# Patient Record
Sex: FEMALE | Race: WHITE
[De-identification: names, ages, dates, MRNs, and addresses within clinical notes are randomized per-mention and may not be internally consistent; named-entity substitution may affect disease eponyms.]

---

## 2017-07-06 ENCOUNTER — HOSPITAL ENCOUNTER (EMERGENCY)
Dept: HOSPITAL 25 - UCCORT | Age: 67
Discharge: HOME | End: 2017-07-06
Payer: COMMERCIAL

## 2017-07-06 VITALS — DIASTOLIC BLOOD PRESSURE: 68 MMHG | SYSTOLIC BLOOD PRESSURE: 131 MMHG

## 2017-07-06 DIAGNOSIS — R11.0: ICD-10-CM

## 2017-07-06 DIAGNOSIS — R07.89: ICD-10-CM

## 2017-07-06 DIAGNOSIS — K04.7: Primary | ICD-10-CM

## 2017-07-06 DIAGNOSIS — F17.210: ICD-10-CM

## 2017-07-06 DIAGNOSIS — Z88.1: ICD-10-CM

## 2017-07-06 DIAGNOSIS — Z90.710: ICD-10-CM

## 2017-07-06 PROCEDURE — 99211 OFF/OP EST MAY X REQ PHY/QHP: CPT

## 2017-07-06 PROCEDURE — 93005 ELECTROCARDIOGRAM TRACING: CPT

## 2017-07-06 PROCEDURE — G0463 HOSPITAL OUTPT CLINIC VISIT: HCPCS

## 2017-07-06 NOTE — UC
UC General HPI





- HPI Summary


HPI Summary: 





Nausea and dizziness off on for 4 days; constipation. Three upper right teeth 

extraction 6/20/17. Pt finished script for Amoxicillin  BID 6/30/17. Headache, 

right upper facial pain reported. Also right sided ear pressure and recent 

allergy symptoms. Has had some upset stomach since taking the amoxicillin and 

some nausea but no vomiting or diarrhea. no MARCH or CP with exertion -- she has 

had stress and noticed chest pressure before bed the past 2-3 days but more on 

her mind and not with exertion 


[ End ]





- History of Current Complaint


Chief Complaint: UCDentalProblem


Stated Complaint: FEVER ORAL COMPLAINT NAUSEA


Time Seen by Provider: 07/06/17 12:59


Hx Obtained From: Patient


Onset/Duration: Gradual Onset


Timing: Constant


Onset Severity: Mild


Current Severity: Moderate





- Allergy/Home Medications


Allergies/Adverse Reactions: 


 Allergies











Allergy/AdvReac Type Severity Reaction Status Date / Time


 


Clavulanic Acid Allergy  GI Upset Verified 07/06/17 12:49





[From Augmentin]     


 


ENVIRONMENTAL Allergy  Congestion Uncoded 07/06/17 12:49


 


seasonal Allergy  Congestion Uncoded 07/06/17 12:49














PMH/Surg Hx/FS Hx/Imm Hx


Previously Healthy: Yes





- Surgical History


Surgical History: Yes


Surgery Procedure, Year, and Place: thyroid 1970s, sinus 1987, hysterectomy 

1995.  right carpal tunnel surgery 7/2016





- Family History


Known Family History: Positive: Cardiac Disease - Mom at 93, Diabetes, Other - 

no COPD or asthma Fhx





- Social History


Occupation: Employed Full-time


Lives: With Family


Alcohol Use: Occasionally


Substance Use Type: None


Smoking Status (MU): Heavy Every Day Tobacco Smoker


Type: Cigarettes


Amount Used/How Often: < 10 CIGARETTES PER DAY


Length of Time of Smoking/Using Tobacco: 30+ YEARS


Have You Smoked in the Last Year: Yes


Household Exposure Type: Cigarettes


Cessation Counseling: Patient Advised to Stop





- Immunization History


Most Recent Influenza Vaccination: not this season





Review of Systems


Constitutional: Fatigue


Skin: Negative


Eyes: Negative


ENT: Negative


Respiratory: Negative


Cardiovascular: Other - intermittent chest pressure just before bed past 2 days


Gastrointestinal: Abdominal Pain - mid epigastric intermittently and RLQ 

intermittently for 10 days, Nausea


Genitourinary: Negative


Motor: Negative


Neurovascular: Negative


Musculoskeletal: Negative


Neurological: Headache


Psychological: Negative


All Other Systems Reviewed And Are Negative: Yes





Physical Exam


Triage Information Reviewed: Yes


Appearance: Well-Appearing, No Pain Distress, Well-Nourished


Vital Signs: 


 Initial Vital Signs











Temp  98.3 F   07/06/17 12:35


 


Pulse  83   07/06/17 12:35


 


Resp  12   07/06/17 12:35


 


BP  131/68   07/06/17 12:35


 


Pulse Ox  100   07/06/17 12:35











Vital Signs Reviewed: Yes


Eye Exam: Normal


ENT Exam: Normal


ENT: Positive: Hearing grossly normal, Pharynx normal, TM dull - R/L.  Negative

: Tonsillar swelling, Tonsillar exudate, Trismus, Muffled/hoarse voice


Dental: Positive: Percussion Tenderness @ - Right upper 2 teeth aroud the other 

teeth that were removed recently, Gross Decay/Caries @.  Negative: Abscess @


Neck exam: Normal


Neck: Positive: 1


Respiratory Exam: Normal


Cardiovascular Exam: Normal


Abdominal Exam: Normal


Abdomen Description: Positive: No Organomegaly, Soft, Other: - very mild mid 

epigastric and RLQ pain to palpation.  Negative: CVA Tenderness (R), CVA 

Tenderness (L), Distended, Guarding


Musculoskeletal Exam: Normal


Neurological Exam: Normal


Psychological Exam: Normal


Skin Exam: Normal





Re-Evaluation





- Re-Evaluation


  ** First Eval


Change: Unchanged - EKG shows NSR with old possible MI -- no active angina / 

chest pressure with exertion -- very active at work and no pain / CP / Palp / 

MARCH -- I advised heavily to f/u with PCP to get stress testing performed in 

very near future and if this Chest pressure changes / worsens to go to ED 

immediately as she declined going to ED at this time and aware or risks.





Course/Dx





- Course


Course Of Treatment: Mid epigastric tenderness could be from recent Antibiotic 

use = advised to start probiotics -- since she finished the antibiotics her 

tooth pain has worsened and now with a headache on the right side as well. Its 

possible she is having more infection in the mouth especially since she smokes 

still so there is concern for dry socket as well. I advise to stop smoking. 

Resume antibiotics, have anti nausea med available and get stress test as a 

precaution with her PCP in the next week or so . She is agreeable to plan at 

this time. No ED needed today since patient feels asymptomatic at this time but 

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 

will go to PCP today for f/u and set up further testing and aware to go to ED 

if any change in symptoms.





- Differential Dx - Multi-Symptom


Provider Diagnoses: Dental abscess / nausea / atypical chest pressure





Discharge





- Discharge Plan


Condition: Good


Disposition: HOME


Prescriptions: 


Amoxicillin CAP* [Amoxicillin 500 MG CAP*] 500 mg PO TID #21 cap


Ondansetron ODT TAB* [Zofran 4 MG Odt TAB*] 4 mg PO Q8H PRN #20 tab.odt


 PRN Reason: Nausea


Patient Education Materials:  Dental Abscess (ED), Gastritis (ED)


Referrals: 


HERNANDEZ Cee PA [Primary Care Provider] - 1 Day


Additional Instructions: 


Please follow up with your PCP ASAP for further eval

## 2017-09-28 ENCOUNTER — HOSPITAL ENCOUNTER (EMERGENCY)
Dept: HOSPITAL 25 - UCCORT | Age: 67
Discharge: HOME | End: 2017-09-28
Payer: COMMERCIAL

## 2017-09-28 VITALS — SYSTOLIC BLOOD PRESSURE: 122 MMHG | DIASTOLIC BLOOD PRESSURE: 71 MMHG

## 2017-09-28 DIAGNOSIS — F17.210: ICD-10-CM

## 2017-09-28 DIAGNOSIS — J32.9: Primary | ICD-10-CM

## 2017-09-28 PROCEDURE — G0463 HOSPITAL OUTPT CLINIC VISIT: HCPCS

## 2017-09-28 PROCEDURE — 86803 HEPATITIS C AB TEST: CPT

## 2017-09-28 PROCEDURE — 99212 OFFICE O/P EST SF 10 MIN: CPT

## 2017-09-28 PROCEDURE — 36415 COLL VENOUS BLD VENIPUNCTURE: CPT

## 2017-09-28 NOTE — UC
Throat Pain/Nasal Roni HPI





- HPI Summary


HPI Summary: 





Pt presents with c/o nasal congestion, sinus pressure and pain X3 weeks.  Pt 

reports that she has a "fever at night".  Denies weight loss in the last 6 

months.  





- History of Current Complaint


Chief Complaint: UCGeneralIllness


Stated Complaint: SINUS,HEADACHE


Time Seen by Provider: 09/28/17 12:41


Hx Obtained From: Patient


Hx Last Menstrual Period: n/a


Pregnant?: No


Onset/Duration: Gradual Onset, Lasting Weeks - 3, Worse Since - onset


Severity: Moderate


Associated Signs & Symptoms: Positive: Sinus Discomfort


Related History: Seasonal Allergies





- Epiglottits Risk Factors


Epiglottis Risk Factors: Negative





- Allergies/Home Medications


Allergies/Adverse Reactions: 


 Allergies











Allergy/AdvReac Type Severity Reaction Status Date / Time


 


Clavulanic Acid Allergy  GI Upset Verified 09/28/17 12:40





[From Augmentin]     


 


ENVIRONMENTAL Allergy  Congestion Uncoded 09/28/17 12:40


 


seasonal Allergy  Congestion Uncoded 09/28/17 12:40











Home Medications: 


 Home Medications





Omeprazole CAP* [Prilosec CAP* 20 MG] 20 mg PO DAILY 09/28/17 [History 

Confirmed 09/28/17]











PMH/Surg Hx/FS Hx/Imm Hx


Previously Healthy: Yes





- Surgical History


Surgical History: Yes


Surgery Procedure, Year, and Place: thyroid 1970s, sinus 1987, hysterectomy 

1995.  right carpal tunnel surgery 7/2016





- Family History


Known Family History: Positive: Cardiac Disease - Mom at 93, Diabetes, Other - 

no COPD or asthma Fhx





- Social History


Occupation: Employed Full-time


Lives: Alone


Alcohol Use: Occasionally


Substance Use Type: None


Smoking Status (MU): Heavy Every Day Tobacco Smoker


Type: Cigarettes


Amount Used/How Often: 1/2 PPD


Length of Time of Smoking/Using Tobacco: 30+ YEARS


Have You Smoked in the Last Year: Yes


Household Exposure Type: Cigarettes





- Immunization History


Most Recent Influenza Vaccination: not this season





Review of Systems


Constitutional: Fever - at night


Skin: Negative


Eyes: Negative


ENT: Sinus Congestion, Sinus Pain/Tenderness, Other - nasal congestion


Respiratory: Negative


Cardiovascular: Negative


Gastrointestinal: Negative


Genitourinary: Negative


Motor: Negative


Neurovascular: Negative


Musculoskeletal: Negative


Neurological: Headache


Psychological: Negative


Is Patient Immunocompromised?: No


All Other Systems Reviewed And Are Negative: Yes





Physical Exam


Triage Information Reviewed: Yes


Appearance: Well-Appearing


Vital Signs: 


 Initial Vital Signs











Temp  98 F   09/28/17 12:42


 


Pulse  83   09/28/17 12:42


 


Resp  20   09/28/17 12:42


 


BP  122/71   09/28/17 12:42


 


Pulse Ox  100   09/28/17 12:42











Vital Signs Reviewed: Yes


Eye Exam: Normal


ENT Exam: Other


ENT: Positive: Nasal congestion, Other: - sinus tenderness, frontal and 

maxillary


Neck exam: Normal


Respiratory Exam: Normal


Cardiovascular Exam: Normal


Musculoskeletal Exam: Normal


Neurological Exam: Normal


Psychological Exam: Normal


Skin Exam: Normal





Throat Pain/Nasal Course/Dx





- Differential Dx/Diagnosis


Differential Diagnosis/HQI/PQRI: Sinusitis, URI


Provider Diagnoses: sinusitis





Discharge





- Discharge Plan


Condition: Stable


Disposition: HOME


Prescriptions: 


Amoxicillin PO (*) [Amoxicillin 875 MG (*)] 875 mg PO Q12H #20 tab


Patient Education Materials:  Sinusitis (ED)


Referrals: 


HERNANDEZ Cee PA [Primary Care Provider] - If Needed

## 2018-01-09 ENCOUNTER — HOSPITAL ENCOUNTER (EMERGENCY)
Dept: HOSPITAL 25 - UCCORT | Age: 68
Discharge: HOME | End: 2018-01-09
Payer: MEDICARE

## 2018-01-09 VITALS — DIASTOLIC BLOOD PRESSURE: 76 MMHG | SYSTOLIC BLOOD PRESSURE: 125 MMHG

## 2018-01-09 DIAGNOSIS — J32.9: Primary | ICD-10-CM

## 2018-01-09 DIAGNOSIS — Z72.0: ICD-10-CM

## 2018-01-09 DIAGNOSIS — Z72.89: ICD-10-CM

## 2018-01-09 PROCEDURE — 99212 OFFICE O/P EST SF 10 MIN: CPT

## 2018-01-09 PROCEDURE — G0463 HOSPITAL OUTPT CLINIC VISIT: HCPCS

## 2018-01-09 NOTE — UC
Throat Pain/Nasal Roni HPI





- HPI Summary


HPI Summary: 





sinus pain and pressure x 4 days


cold sx x 2 weeks


no fever, no chills, no cough 





- History of Current Complaint


Chief Complaint: UCGeneralIllness


Stated Complaint: SINUS COMPLAINT


Time Seen by Provider: 01/09/18 10:56


Hx Obtained From: Patient


Hx Last Menstrual Period: n/a


Onset/Duration: Gradual Onset, Lasting Days - 4, Still Present


Severity: Moderate


Cough: None


Associated Signs & Symptoms: Positive: Sinus Discomfort, Nasal Discharge.  

Negative: Wheezing, Hoarseness, Fever, Vomiting, Rash





- Allergies/Home Medications


Allergies/Adverse Reactions: 


 Allergies











Allergy/AdvReac Type Severity Reaction Status Date / Time


 


Clavulanic Acid Allergy  GI Upset Verified 01/09/18 10:55





[From Augmentin]     


 


ENVIRONMENTAL Allergy  Congestion Uncoded 01/09/18 10:55


 


seasonal Allergy  Congestion Uncoded 01/09/18 10:55














PMH/Surg Hx/FS Hx/Imm Hx


Endocrine History: Thyroid Disease





- Surgical History


Surgical History: Yes


Surgery Procedure, Year, and Place: thyroid 1970s, sinus 1987, hysterectomy 

1995.  right carpal tunnel surgery 7/2016





- Family History


Known Family History: Positive: Cardiac Disease - Mom at 93, Diabetes, Other - 

no COPD or asthma Fhx





- Social History


Alcohol Use: Occasionally


Substance Use Type: None


Smoking Status (MU): Heavy Every Day Tobacco Smoker


Type: Cigarettes


Amount Used/How Often: 1/2 PPD


Length of Time of Smoking/Using Tobacco: 30+ YEARS


Have You Smoked in the Last Year: Yes


Household Exposure Type: Cigarettes





- Immunization History


Most Recent Influenza Vaccination: not this season





Review of Systems


Constitutional: Negative


Skin: Negative


Eyes: Negative


ENT: Nasal Discharge, Sinus Congestion, Sinus Pain/Tenderness


Respiratory: Negative


Cardiovascular: Negative


Is Patient Immunocompromised?: No


All Other Systems Reviewed And Are Negative: Yes





Physical Exam


Triage Information Reviewed: Yes


Appearance: Well-Appearing, No Pain Distress, Well-Nourished


Vital Signs: 


 Initial Vital Signs











Temp  98 F   01/09/18 10:51


 


Pulse  80   01/09/18 10:51


 


Resp  16   01/09/18 10:51


 


BP  125/76   01/09/18 10:51


 


Pulse Ox  100   01/09/18 10:51











Vital Signs Reviewed: Yes


Eyes: Positive: Conjunctiva Clear


ENT Exam: Normal


ENT: Positive: Normal ENT inspection, Hearing grossly normal, Pharyngeal 

erythema, Nasal congestion, Sinus tenderness


Neck: Positive: Supple, Nontender, No Lymphadenopathy


Respiratory: Positive: Chest non-tender, Lungs clear, Normal breath sounds


Cardiovascular: Positive: RRR, No Murmur, Pulses Normal


Skin Exam: Normal





Throat Pain/Nasal Course/Dx





- Differential Dx/Diagnosis


Provider Diagnoses: sinusitis





Discharge





- Discharge Plan


Condition: Critical


Disposition: HOME


Prescriptions: 


Azithromycin TAB* [Zithromax TAB (Z-KAVON) 250 mg #6 tabs] 2 tab PO .TODAY, THEN 

1 DAILY #1 kavon


Naproxen [Naprosyn 500 mg] 500 mg PO BID #20 tab


Patient Education Materials:  Sinusitis (ED)


Referrals: 


Allyson Cornejo MD [Primary Care Provider] - If Needed

## 2018-05-17 ENCOUNTER — HOSPITAL ENCOUNTER (EMERGENCY)
Dept: HOSPITAL 25 - UCCORT | Age: 68
Discharge: HOME | End: 2018-05-17
Payer: COMMERCIAL

## 2018-05-17 VITALS — DIASTOLIC BLOOD PRESSURE: 66 MMHG | SYSTOLIC BLOOD PRESSURE: 123 MMHG

## 2018-05-17 DIAGNOSIS — Z88.8: ICD-10-CM

## 2018-05-17 DIAGNOSIS — F17.210: ICD-10-CM

## 2018-05-17 DIAGNOSIS — Z88.0: ICD-10-CM

## 2018-05-17 DIAGNOSIS — J20.9: Primary | ICD-10-CM

## 2018-05-17 PROCEDURE — 99213 OFFICE O/P EST LOW 20 MIN: CPT

## 2018-05-17 PROCEDURE — 71046 X-RAY EXAM CHEST 2 VIEWS: CPT

## 2018-05-17 PROCEDURE — G0463 HOSPITAL OUTPT CLINIC VISIT: HCPCS

## 2018-05-17 RX ADMIN — ALBUTEROL SULFATE ONE PUFF: 90 AEROSOL, METERED RESPIRATORY (INHALATION) at 16:12

## 2018-05-17 RX ADMIN — PREDNISONE ONE MG: 20 TABLET ORAL at 16:11

## 2018-05-17 NOTE — RAD
INDICATION:  Cough for 3 weeks.



COMPARISON:  Comparison is made with a prior study from August 04, 2016.



TECHNIQUE: Dual-energy PA  and lateral views of the chest were obtained.



FINDINGS:   The heart is within normal limits in size. Mediastinal and hilar contours

appear within normal limits.



The lungs are clear. No pleural effusion is present. 



IMPRESSION:  NO EVIDENCE FOR ACTIVE CARDIOPULMONARY DISEASE.

## 2018-05-17 NOTE — UC
Respiratory Complaint HPI





- HPI Summary


HPI Summary: 





66 yo female with cough c 3 weeks


productive  


some sinus pain and pressure as well as post nasal drip





f/c/myalgias at illness onset


no n/v/d


no cp or sob











- History of Current Complaint


Chief Complaint: UCGeneralIllness


Stated Complaint: COUGH,COLD LIKE SXS


Time Seen by Provider: 05/17/18 15:31


Hx Obtained From: Patient


Hx Last Menstrual Period: n/a


Onset/Duration: Sudden Onset, Lasting Weeks


Timing: Constant


Severity Initially: Moderate


Severity Currently: Severe


Pain Intensity: 8


Pain Scale Used: 0-10 Numeric


Character: Cough: Productive


Aggravating Factors: Deep Breaths, Recumbent Position


Alleviating Factors: Nothing


Associated Signs And Symptoms: Positive: Wheezing, Nasal Congestion, Sinus 

Discomfort





- Allergies/Home Medications


Allergies/Adverse Reactions: 


 Allergies











Allergy/AdvReac Type Severity Reaction Status Date / Time


 


amoxicillin [From Augmentin] AdvReac  GI Upset Verified 05/17/18 15:21


 


clavulanic acid AdvReac  GI Upset Verified 05/17/18 15:21





[From Augmentin]     


 


ENVIRONMENTAL Allergy  Congestion Uncoded 01/09/18 10:55


 


seasonal Allergy  Congestion Uncoded 01/09/18 10:55











Home Medications: 


 Home Medications





Guaifenesin/Dextromethorphan [Mucinex Dm Maximum Streng  mg] 1 tab PO 

BID PRN 05/17/18 [History Confirmed 05/17/18]


Naproxen [Naprosyn 500 mg] 500 mg PO BID PRN 05/17/18 [History Confirmed 05/17/ 18]











PMH/Surg Hx/FS Hx/Imm Hx


Previously Healthy: Yes





- Surgical History


Surgical History: Yes


Surgery Procedure, Year, and Place: thyroid 1970s, sinus 1987, hysterectomy 

1995.  right carpal tunnel surgery 7/2016





- Family History


Known Family History: Positive: Cardiac Disease - Mom at 93, Diabetes, Other - 

no COPD or asthma Fhx





- Social History


Alcohol Use: Occasionally


Substance Use Type: None


Smoking Status (MU): Heavy Every Day Tobacco Smoker


Type: Cigarettes


Amount Used/How Often: 1/2 PPD


Length of Time of Smoking/Using Tobacco: 30+ YEARS


Have You Smoked in the Last Year: Yes


Household Exposure Type: Cigarettes





- Immunization History


Most Recent Influenza Vaccination: not this season





Review of Systems


Constitutional: Negative


Skin: Negative


Eyes: Negative


ENT: Sinus Congestion, Sinus Pain/Tenderness


Respiratory: Cough


Cardiovascular: Negative


Gastrointestinal: Negative


Genitourinary: Negative


Motor: Negative


Neurovascular: Negative


Musculoskeletal: Negative


Neurological: Negative


Psychological: Negative


Is Patient Immunocompromised?: No


All Other Systems Reviewed And Are Negative: Yes





Physical Exam


Triage Information Reviewed: Yes


Appearance: Well-Appearing, No Pain Distress, Well-Nourished


Vital Signs: 


 Initial Vital Signs











Temp  98.6 F   05/17/18 15:24


 


Pulse  79   05/17/18 15:24


 


Resp  20   05/17/18 15:24


 


BP  123/66   05/17/18 15:24


 


Pulse Ox  99   05/17/18 15:24











Vital Signs Reviewed: Yes


Eyes: Positive: Conjunctiva Clear


ENT: Positive: Hearing grossly normal, Pharyngeal erythema, Sinus tenderness, 

Uvula midline.  Negative: Nasal congestion, Nasal drainage, Tonsillar swelling, 

Tonsillar exudate, Trismus, Muffled voice, Hoarse voice


Neck: Positive: Supple, Nontender, No Lymphadenopathy


Respiratory: Positive: No respiratory distress, No accessory muscle use, Rhonchi

, Wheezing.  Negative: Respiratory distress


Cardiovascular: Positive: RRR, No Murmur


Musculoskeletal: Positive: ROM Intact, No Edema


Neurological: Positive: Alert


Psychological Exam: Normal


Skin Exam: Normal





UC Diagnostic Evaluation





- Laboratory


O2 Sat by Pulse Oximetry: 99 - normal/not hypoxic





- Radiology


Xray Interpretation: No Acute Changes


Radiology Interpretation Completed By: Radiologist





Respiratory Course/Dx





- Differential Dx/Diagnosis


Provider Diagnoses: acute bronchitis with bronchospasm





Discharge





- Sign-Out/Discharge


Documenting (check all that apply): Discharge/Admit/Transfer





- Discharge Plan


Condition: Stable


Disposition: HOME


Prescriptions: 


Amoxicillin PO (*) [Amoxicillin 875 MG (*)] 875 mg PO BID #14 tab


predniSONE [Deltasone] 20 mg PO DAILY #4 tab


Patient Education Materials:  Acute Bronchitis (ED)


Referrals: 


Allyson Cornejo MD [Primary Care Provider] - 4 Days (if not better)


Additional Instructions: 


use inhaler as directed





Chest XR was normal





- Billing Disposition and Condition


Condition: STABLE


Disposition: HOME

## 2018-05-17 NOTE — XMS REPORT
Haydee Mac

 Created on:2018



Patient:Haydee Mac

Sex:Female

:1950

External Reference #:[id number]





Demographics







 Address  [address] [place] [address]

 

 Home Phone  [phone]

 

 Mobile Phone  [phone]

 

 Work Phone  [phone]

 

 Preferred Language  English

 

 Marital Status  Not  Or 

 

 Rastafarian Affiliation  Unknown

 

 Race  White

 

 Ethnic Group  Not  Or 









Author







 Organization  [facility]

 

 Address  [address] [place] [address]

 

 Phone  [phone]









Care Team Providers







 Name  Role  Phone

 

 Kimmie Cross MD  Care Team Information   Unavailable

 

 Kimmie Cross MD  Primary Care Physician  Unavailable









Payers







 Type  Date  Identification Numbers  Payment Provider  Subscriber

 

 Health Maintenance    Policy Number: [id number]  [place] Plan  Haydee Mac



 Organization (O)      [facility]  









 PayID: [id number]  [address]









 [place] [address]







Problems







 Date  Description  Provider  Status

 

 Onset: 2014  Central perforation of tympanic membrane  Ryan Welch M.D.  Active

 

 Onset: 2014  Chronic otitis media  Ryan Welch M.D.  Active

 

 Onset: 2014  Otorrhea  Ryan Welch M.D.  Active







Family History







 Date  Family Member(s)  Problem(s)  Comments

 

   General  Cancer  

 

   General  Hearing Loss  

 

   General  Diabetes  

 

   General  Asthma And Allergies  







Social History







 Type  Date  Description  Comments

 

 Marital Status    Single  

 

 Occupation    , [employer]  

 

 Cigarette Use    currently smokes 1/2 Pack Daily  

 

 ETOH Use    Current Alcohol Use - 1-3 Days A Week.  

 

 Recreational Drug Use    Never Used Drugs  







Allergies, Adverse Reactions, Alerts







 Date  Description  Reaction  Status  Severity  Comments

 

 10/15/2007  Augmentin  GI SX  active    







Medications







 Medication  Date  Status  Form  Strength  Qnty  SIG  Indications  Ordering



                 Provider

 

 Nexium    Active  Capsules DR  40mg    1 Qday    Unknown



   /0000              

 

 Allegra-D    Active  Tablets ER  180-240mg  90tab  1 by mouth    Unknown



   /    24HR    s  every day    

 

                 

 

 Azithromycin    Hx  Tablets  250mg  6tabs  2 by mouth    Welch,



             every day 1Ryan



   -          1 by mouth    M.D.



             every day 2-              

 

 Ciprodex    Hx  Suspension  0.3-0.1%  7.500  3-4 drops in    ,



           ml  affected ear    Ryan,



   -          twice a day    M.D.



             for 1 week    



                 

 

 Ciprodex    Hx  Suspension  0.3-0.1%  7.500  3-4 drops in    ,



           ml  affected ear    Ryan,



   -          twice a day    M.D.



             for 1 week    



                 

 

 Azithromycin    Hx  Tablets  500mg  5tabs  1 by mouth    ,



             every day    Ryan,



   -              M.D.



                 

 

 Dexamethasone    Hx  Tablets  6mg  2tabs  1 by mouth    ,



             every day    Ryan,



   -              M.D.



                 

 

 Azelastine HCL    Hx  Solution  0.15%  3unit  2 spays    ,



 (Nasal)          s  everyday both    Ryan,



   -          nostrils    M.D.



                 

 

 Fluticasone    Hx  Suspension  50mcg/Act  1unit  2 sprays both    ,



 Propionate  /2016        s  nostrils    Ryan,



   -          every day    M.D.



                 

 

 Azithromycin    Hx  Tablets  250mg  6tabs  2 pills x 1  H90.12            day then 1    Ryan,



   -          every day for    M.D.



             4 days    



   /2016              

 

 Percocet    Hx  Tablets  2.5-325mg  18tab  two tab every            s  four hour for    Ryan,



   -          pain as    M.D.



   .    



                 

 

 Prednisone    Hx  Tablets  10mg  5tabs  1 by mouth              every morning    Ryan,



   -              M.D.



                 

 

 Ciprodex    Hx  Suspension  0.3-0.1%  7.500  3-4 drops in            ml  left ear    Ryan,



   -          twice a day    M.D.



             for 1 week    



                 

 

 Azithromycin    Hx  Tablets  500mg  7tabs  1 po qd                  Ryan,



   -              M.D.



                 

 

 Amoxicillin    Hx  Tablets  875mg  20tab  1 po bid for            s  10 days    Ryan,



   -              M.D.



                 

 

 Ibuprofen    Hx  Tablets  600mg  60tab  1 by mouth            s  four times a    Ryan,



   -          day as needed    M.D.



             pc    



                 

 

 Zithromax    Hx  Tablets  250mg  1Pak  per pkg    SAUL Welch-          instructions    Ryan,



   -              M.D.



                 

 

 Ciprodex    Hx  Suspension  0.3-0.1%  7.5ml  3-4 gtts bid              in affected    Ryan,



   -          ear x 1 wk    M.D.



                 

 

 Ciprodex    Hx  Suspension  0.3-0.1%  7.5ml  4 drops in              right ear    Ryan,



   -          canal twice    M.D.



             daily x 5    



             days    

 

 Cephalexin    Hx  Tablets  500mg  14tab  1 po bid x 7            s  days    Ryan,



   -              M.D.



                 

 

 Prednisone    Hx  Tablets  20mg  10tab  2 po qd x 5            s  days    Ryan,



   -              M.D.



                 

 

 Amoxicillin    Hx  Tablets  500mg  14tab  1 po bid            s      Ryan,



   -              M.D.



                 

 

 Fluticasone    Hx  Suspension  50mcg/Act  16uni  instill 2    Welch,



 Propionate  /2010        ts  sprays into    [facility],



   -          each nostril    M.D.



             once daily    



                 

 

 Ciprodex    Hx  Suspension  0.3-0.1%  7.500  3-4 gtts bid            ml  in affected    Ryan,



   -          ear x 1 wk    M.D.



                 

 

 Veramyst    Hx  Suspension  27.5mcg/S  1unit  2 sprays both          pray  s  nostrils qd    Ryan,



   -              M.D.



                 

 

 Ciprodex    Hx  Suspension  0.3-0.1%  7.500  3-4 gtts bid            ml  in affected    Ryan,



   -          ear x 10 days    M.D.



   08/10              



   /2009              

 

 Zithromax    Hx  Tablets  250mg  1tabs  per pkg    SCARLETT Welch          instructions    Ryan,



   -              M.D.



   08/10              



   /2009              

 

 Nasonex    Hx  Suspension  50mcg/Act  1unit  2 sprays each            s  nostril daily    Ryan,



   -              M.D.



                 

 

 Astelin    Hx  Solution  137mcg/Sp  1unit  2 sprays both          ray  s  nostrils    Ryan,



   -          daily.    M.D.



                 

 

 Zithromax    Hx  Tablets  250mg  1Pak  as Directed    SCARLETT Welch              Ryan,



   -              M.D.



                 

 

 Nasonex    Hx  Suspension  50mcg/Act  1unit  2 sprays each            s  nostril daily    Ryan,



   -          qd    M.D.



                 

 

 Floxin Otic    Hx  Solution  0.3%  1unit  2 gtts Left            s  Ear bid    Ryan,



   -              M.D.



                 

 

 Floxin Otic  10/15  Hx  Solution  0.3%  5ml  2 gtts tid in              affected ear    Ryan,



   -              M.D.



   10/25              



   /2007              

 

 Astelin    Hx  Solution  137mcg/Sp  1ml  1 Spray bid    Unknown



   /0000      ray        



   -              



                 

 

 Claritin-D 12    Hx  Tablets ER  5-120mg    1 Tab PO qam    Unknown



 Hour  /0000    12HR          



   -              



                 

 

 Nasonex  00  Hx  Suspension  50mcg/Act  1bott  2 squirts    Unknown



   /0000        le  each nostril    



   -          qd    



                 

 

 Azelastine HCL  00  Hx  Solution  137mcg/Sp  3unit  2 spays qday    Unknown



   /0000      ray  s  both nostrils    



   -              



                 







Vital Signs







 Date  Vital  Result  Comment

 

 2018  Weight  169.00 lb  









 Height  68 inches  5'8"

 

 BMI (Body Mass Index)  25.7 kg/m2  

 

 BP Systolic  137 mmHg  

 

 BP Diastolic  82 mmHg  

 

 Heart Rate  87 /min  

 

 O2 % BldC Oximetry  100 %  

 

 Body Temperature  97.3 F  

 

 Pain Level  0  









 2016  Weight  160.00 lb  









 Height  68 inches  5'8"

 

 BMI (Body Mass Index)  24.3 kg/m2  

 

 BP Systolic  120 mmHg  

 

 BP Diastolic  84 mmHg  

 

 Heart Rate  83 /min  

 

 O2 % BldC Oximetry  98 %  

 

 Body Temperature  97.5 F  









 2015  BP Systolic  124 mmHg  









 BP Diastolic  78 mmHg  

 

 Heart Rate  90 /min  

 

 O2 % BldC Oximetry  98 %  

 

 Body Temperature  98.2 F  

 

 Pain Level  0  









 2015  BP Systolic  102 mmHg  









 BP Diastolic  62 mmHg  

 

 Body Temperature  96.3 F  









 2014  Weight  165.38 lb  









 BP Systolic  102 mmHg  

 

 BP Diastolic  76 mmHg  

 

 Body Temperature  97.6 F  









 2013  Weight  165.00 lb  









 Height  68 inches  5'8"

 

 BMI (Body Mass Index)  25.1 kg/m2  

 

 BP Systolic  122 mmHg  

 

 BP Diastolic  88 mmHg  

 

 Heart Rate  84 /min  

 

 O2 % BldC Oximetry  95 %  

 

 Body Temperature  98.7 F  









 2011  Weight  170.00 lb  









 Height  68 inches  5'8"

 

 BMI (Body Mass Index)  25.8 kg/m2  

 

 BP Systolic  122 mmHg  

 

 BP Diastolic  72 mmHg  

 

 Heart Rate  91 /min  

 

 O2 % BldC Oximetry  97 %  

 

 Body Temperature  97.6 F  









 10/01/2010  Body Temperature  98.1 F  

 

 2010  BP Systolic  110 mmHg  









 BP Diastolic  70 mmHg  

 

 Heart Rate  88 /min  

 

 Body Temperature  98.0 F  









 2010  Weight  166.00 lb  









 Height  68 inches  5'8"

 

 BMI (Body Mass Index)  25.2 kg/m2  

 

 Body Temperature  98.1 F  









 10/14/2009  Weight  167.50 lb  









 Height  68 inches  5'8"

 

 BMI (Body Mass Index)  25.5 kg/m2  









 2009  Body Temperature  97.8 F  

 

 2009  BP Systolic  120 mmHg  









 BP Diastolic  80 mmHg  

 

 Heart Rate  81 /min  

 

 O2 % BldC Oximetry  100 %  

 

 Body Temperature  98.0 F  









 2009  Weight  176.00 lb  









 Height  68 inches  5'8"

 

 BMI (Body Mass Index)  26.8 kg/m2  

 

 BP Systolic  115 mmHg  

 

 BP Diastolic  86 mmHg  

 

 Body Temperature  97.8 F  









 2008  Weight  179.31 lb  









 BP Systolic  131 mmHg  

 

 BP Diastolic  88 mmHg  

 

 Heart Rate  89 /min  

 

 O2 % BldC Oximetry  99 %  









 2007  Weight  172.00 lb  









 BP Systolic  116 mmHg  

 

 BP Diastolic  78 mmHg  

 

 Body Temperature  96.7 F  







Results







 Test  Date  Test  Result  H/L  Range  Note

 

 Laboratory test finding  2010  Soft Tissue Other  See Note      1









 1  OPERATION/PROCEDURE



   Excisional biopsy right neck mass



   DIAGNOSIS:



   "RIGHT NECK MASS":



   



   EPIDERMOID INCLUSION CYST.



   



   WYS/clf



   D: 2010 11:59:59 AM



   T: 2010  1:00



   GROSS



   "RIGHT NECK MASS".  Received in a single container appropriately labeled is 
an



   elliptical excision of skin overall measuring 2.7 x 1.8 cm. to a depth of 
1.0 cm.  The



   central skin surface has an indurated area of flattening, occupying the 
majority of the



   central skin surface.  The deep surface is painted.  The specimen is 
serially sectioned



   revealing the presence of a yellow cottage cheese-like cyst.  This is 
submitted in



   representative section within a single cassette.  WS/clf



   MICROSCOPIC



   Sections show a thin walled cystic structure lined by squamous epithelium 
with a



   granular layer and central keratinous debris.



   PRE OPERATIVE DIAGNOSIS



   Right neck mass



   REVIEW CODE



   CODE: I



   -----------------------------------------------------------------------------
---------------



   LARS Araujo MD 09/27/10



   -----------------------------------------------------------------------------
---------------







Procedures







 Date  CPT Code  Description  Status

 

 2015  90949  Tympanometry  Completed

 

 2015  58588  Audiometry, Comprehensive  Completed

 

 2010  90971  Removal Of Lesion/1.1 To 2.0cm  Completed

 

 2008  46885  Acoustic Reflex Testing  Completed

 

 2008  98446  Tympanometry  Completed

 

 2008  61605  Audiometry, Comprehensive  Completed

 

 2007  95590  Acoustic Reflex Testing  Completed

 

 2007  95032  Tympanometry  Completed

 

 2007  11330  Audiometry, Comprehensive  Completed







Encounters







 Type  Date  Location  Provider  CPT E/M  Dx

 

 Office Visit  2016  2:00p  Main Office  Ryan Welch M.D.  12213  J06.9









 J32.9









 Office Visit  2016  4:00p  Main Office  Ryan Welch M.D.  61264  H72.02









 J31.0









 Office Visit  2015  1:45p  Main Office  Alisha Castano NP  63958  
H90.12









 H72.02









 Office Visit  2015  4:45p  Main Office  Ryan Welch M.D.  90655  381.10









 388.70

 

 473.9









 Office Visit  2014  9:45a  Main Office  Ryan Welch M.D.  49933  384.21









 381.10

 

 388.60









 Office Visit  2013  1:45p  Main Office  Alisha Castano NP  92344  
384.21









 461.9









 Office Visit  2011  3:15p  Main Office  Petrona David PA  47674  381.10









 384.21









 Office Visit  2011  2:30p  Main Office  Petrona David PA  40765  474.11









 463

 

 461.0









 Office Visit  2010  2:30p  Main Office  Ryan Welch M.D.  46257  384.21









 381.10

 

 461.9









 Office Visit  10/01/2010  9:15a  Main Office  Petrona David PA  20344  706.2

 

 Office Visit  2010  9:15a  Main Office  Tonia Christensen PA  37456  
388.70









 381.10









 Office Visit  2010 10:45a  Main Office  Tonia Christensen PA  94199  
381.81









 463

 

 381.10









 Office Visit  2010  9:45a  Main Office  Tonia Christensen PA  33505  
463









 381.81

 

 465.8









 Office Visit  10/14/2009 11:15a  Main Office  Ryan Welch M.D.  71604  380.4









 381.81

 

 384.21

 

 472.0









 Office Visit  08/10/2009  9:45a  Main Office  Ryan Welch M.D.  50225  384.21









 381.10

 

 472.0

 

 473.9









 Office Visit  2009  9:30a  Main Office  Ryan Welch M.D.  68688  472.0









 461.9

 

 473.9

 

 381.10

 

 384.21









 Office Visit  2009  9:30a  Main Office  Petrona David PA  51255  472.0









 461.9

 

 463









 Office Visit  2009  1:30p  Main Office  Petrona David PA  62365  461.9









 472.0









 Office Visit  2008  8:45a  Main Office  Ryan Welch M.D.  96924  384.21









 473.9









 Office Visit  2008  3:45p  Main Office  Ryan Welch M.D.  59348  384.21









 473.9









 Office Visit  2008 11:00a  Main Office  Petrona David PA  13922  382.01









 384.21

 

 461.9









 Office Visit  2007  8:15a  Main Office  Ryan Welch M.D.  49628  384.21









 382.01









 Office Visit  10/19/2007  3:30p  Main Office  Ryan Welch M.D.  19340  384.21









 382.01









 Office Visit  10/15/2007  4:30p  Main Office  Ryan Welch M.D.  02191  382.01









 384.21







Plan of Care

Future Appointment(s):05/15/2018  4:00 pm - Ryan Welch M.D. at Main Office

## 2018-08-14 ENCOUNTER — HOSPITAL ENCOUNTER (EMERGENCY)
Dept: HOSPITAL 25 - UCCORT | Age: 68
Discharge: HOME | End: 2018-08-14
Payer: COMMERCIAL

## 2018-08-14 VITALS — DIASTOLIC BLOOD PRESSURE: 80 MMHG | SYSTOLIC BLOOD PRESSURE: 136 MMHG

## 2018-08-14 DIAGNOSIS — K59.00: ICD-10-CM

## 2018-08-14 DIAGNOSIS — R10.84: Primary | ICD-10-CM

## 2018-08-14 DIAGNOSIS — Z88.0: ICD-10-CM

## 2018-08-14 DIAGNOSIS — Z88.8: ICD-10-CM

## 2018-08-14 DIAGNOSIS — F17.210: ICD-10-CM

## 2018-08-14 PROCEDURE — 74176 CT ABD & PELVIS W/O CONTRAST: CPT

## 2018-08-14 PROCEDURE — 99211 OFF/OP EST MAY X REQ PHY/QHP: CPT

## 2018-08-14 PROCEDURE — 80053 COMPREHEN METABOLIC PANEL: CPT

## 2018-08-14 PROCEDURE — 36415 COLL VENOUS BLD VENIPUNCTURE: CPT

## 2018-08-14 PROCEDURE — G0463 HOSPITAL OUTPT CLINIC VISIT: HCPCS

## 2018-08-14 NOTE — UC
Abdominal Pain Female HPI





- HPI Summary


HPI Summary: 





ABDOMINAL PAIN X 1 MONTH 


PAIN IS DIFFUSE , MOSTLY RIGHT LOWER ABD , ACHY , FEELS BLOATED 


RADIATION TO [place] , WORSE WITH EATING , NOTHING MAKES IT BETTER


+ NAUSEA , NO VOMITING , NO DIARRHEA , + CONSTIPATION , NO URINARY SX 





- History of Current Complaint


Chief Complaint: UCGI


Stated Complaint: FEVER,NAUSEA,DIARRHEA


Time Seen by Provider: 08/14/18 12:30


Hx Obtained From: Patient


Hx Last Menstrual Period: n/a


Onset/Duration: Gradual Onset, Lasting Weeks - 4, Still Present


Timing: Constant


Severity Initially: Moderate


Severity Currently: Moderate


Pain Intensity: 8


Location: Diffuse


Radiates: Yes


Character: Aching, Colicy, Cramping


Aggravating Factor(s): Food


Alleviating Factor(s): Nothing


Associated Signs and Symptoms: Positive: Constipation, Nausea.  Negative: 

Diaphoresis, Fever, Cough, Chest Pain, Dizzy, Back Pain, Blood in Stool, 

Urinary Symptoms, Decreased Appetite, Vaginal Bleeding, Vaginal Discharge, 

Vomiting, Diarrhea


Allergies/Adverse Reactions: 


 Allergies











Allergy/AdvReac Type Severity Reaction Status Date / Time


 


amoxicillin [From Augmentin] AdvReac  GI Upset Verified 05/17/18 15:21


 


clavulanic acid AdvReac  GI Upset Verified 05/17/18 15:21





[From Augmentin]     


 


ENVIRONMENTAL Allergy  Congestion Uncoded 01/09/18 10:55


 


seasonal Allergy  Congestion Uncoded 01/09/18 10:55














PMH/Surg Hx/FS Hx/Imm Hx





- Additional Past Medical History


Additional PMH: 





thyroid 1970s, sinus 1987, PARTIAL hysterectomy 1995


right carpal tunnel surgery 7/2016








- Surgical History


Surgical History: Yes


Surgery Procedure, Year, and Place: thyroid 1970s, sinus 1987, PARTIAL 

hysterectomy 1995.  right carpal tunnel surgery 7/2016





- Family History


Known Family History: Positive: Cardiac Disease - Mom at 93, Diabetes, Other - 

no COPD or asthma Fhx





- Social History


Alcohol Use: Occasionally


Substance Use Type: None


Smoking Status (MU): Heavy Every Day Tobacco Smoker


Type: Cigarettes


Amount Used/How Often: 1/2 PPD


Length of Time of Smoking/Using Tobacco: 30+ YEARS


Have You Smoked in the Last Year: Yes


Household Exposure Type: Cigarettes





- Immunization History


Most Recent Influenza Vaccination: not this season





Review of Systems


Constitutional: Negative


Skin: Negative


Eyes: Negative


ENT: Negative


Respiratory: Negative


Cardiovascular: Negative


Gastrointestinal: Abdominal Pain, Nausea


Genitourinary: Negative


Is Patient Immunocompromised?: No


All Other Systems Reviewed And Are Negative: Yes





Physical Exam


Triage Information Reviewed: Yes


Appearance: Well-Appearing, No Pain Distress, Well-Nourished


Vital Signs: 


 Initial Vital Signs











Temp  98.4 F   08/14/18 12:18


 


Pulse  85   08/14/18 12:18


 


Resp  15   08/14/18 12:18


 


BP  136/80   08/14/18 12:18


 


Pulse Ox  99   08/14/18 12:18











Vital Signs Reviewed: Yes


Eye Exam: Normal


Eyes: Positive: Conjunctiva Clear


ENT: Positive: Normal ENT inspection, Hearing grossly normal, Pharynx normal


Neck: Positive: Supple, Nontender, No Lymphadenopathy


Respiratory: Positive: Chest non-tender, Lungs clear, Normal breath sounds


Cardiovascular: Positive: RRR, No Murmur, Pulses Normal


Abdomen Description: Positive: Soft, Other: - DIFFUSE TENDERNESS.  Negative: 

CVA Tenderness (R), CVA Tenderness (L), Distended, Guarding


Bowel Sounds: Positive: Present


Musculoskeletal Exam: Normal


Skin Exam: Normal





Abd Pain Female Course/Dx





- Differential Dx/Diagnosis


Provider Diagnoses: ABDOMINAL PAIN.  CONSTIPATION





Discharge





- Sign-Out/Discharge


Documenting (check all that apply): Patient Departure





- Discharge Plan


Condition: Stable


Disposition: HOME


Patient Education Materials:  Constipation (ED), Abdominal Pain (ED)


Referrals: 


Allyson Cornejo MD [Primary Care Provider] - 7 Days





- Billing Disposition and Condition


Condition: STABLE


Disposition: Home

## 2018-08-14 NOTE — RAD
INDICATION: Constipation. Chronic right lower abdominal pain. Periumbilical pain.



COMPARISON: None

 

TECHNIQUE: Noncontrast axial source images were obtained from the hemidiaphragms to the

symphysis pubis. This examination was ordered without oral or intravenous contrast and

therefore has inherent limitations when used to evaluate intra-abdominal or intrapelvic

pathology. Consider conventional contrast enhanced imaging if clinically indicated.



Lung bases: The lung bases are clear.



Liver: The liver is normal in size. Noncontrast imaging shows no evidence of a hepatic

mass or ductal dilatation.



Gallbladder: There are multiple calcified gallstones. There is no thickening gallbladder

wall or pericholecystic fluid



Spleen: The spleen is normal in size. The noncontrast CT appearance is normal.



Pancreas: Noncontrast imaging shows no pancreatic mass or ductal dilitation.



Adrenal glands: No masses are identified.



Kidneys/Bladder: There is no evidence of nephrolithiasis or CT evidence of hydronephrosis.

Noncontrast imaging shows no evidence of a renal mass.   The bladder is unremarkable..



Adenopathy: There is no evidence of intraperitoneal or retroperitoneal adenopathy.

Evaluation is limited without oral contrast.



Fluid collections: There are no free or localized fluid collections.



Vessels: There are atherosclerotic changes of the aorta and iliac vessels. There is no

focal aneurysm. The IVC appears normal 



Pelvic organs: There is hysterectomy. There is no adnexal mass



GI tract: Evaluation of the bowel is limited without oral contrast. The stomach, small

bowel, and lower GI tract appear grossly normal. There are no obstructive findings. The

appendix is visualized and appears normal.



Soft tissues: No soft tissue abnormalities of the extraperitoneal abdomen or pelvis are

identified.



Osseous structures: There are no acute osseous findings. There is mild multilevel

degenerative spurring of the thoracal lumbar spine. There is degenerative disc disease at

the lower 2 lumbar levels.



IMPRESSION:

1.  Limited noncontrast imaging was performed.

2.  Cholelithiasis.

3.  No additional specific CT findings

## 2019-01-07 ENCOUNTER — HOSPITAL ENCOUNTER (EMERGENCY)
Dept: HOSPITAL 25 - UCCORT | Age: 69
Discharge: HOME | End: 2019-01-07
Payer: COMMERCIAL

## 2019-01-07 VITALS — SYSTOLIC BLOOD PRESSURE: 137 MMHG | DIASTOLIC BLOOD PRESSURE: 70 MMHG

## 2019-01-07 DIAGNOSIS — B96.89: ICD-10-CM

## 2019-01-07 DIAGNOSIS — J01.90: ICD-10-CM

## 2019-01-07 DIAGNOSIS — G44.89: ICD-10-CM

## 2019-01-07 DIAGNOSIS — H10.13: Primary | ICD-10-CM

## 2019-01-07 PROCEDURE — 99212 OFFICE O/P EST SF 10 MIN: CPT

## 2019-01-07 PROCEDURE — G0463 HOSPITAL OUTPT CLINIC VISIT: HCPCS

## 2019-01-07 NOTE — UC
Headache HPI





- HPI Summary


HPI Summary: 





67 y/o female presents to the urgent care c/o nasal congestion w/ green nasal 

discharge, sinus pain, frontal HA for the past 3 weeks. Pt reports she has Hx 

of seasonal allergies, but she thinks her sinusitis has now become bacterial 

because last night she had subjective fever. 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 

morning she was cleaning w/ a new detergent which aggravated her HA and 

developed a burning sensation over her eye w/ itchiness. HA is 8/10 w/ green 

PND. Pt denies SOB, cough, wheezing, dizziness, chest pain, abdominal pain, N/V/

D.








- History Of Current Complaint


Chief Complaint: UCRespiratory


Stated Complaint: HEADACHE


Time Seen by Provider: 01/07/19 15:13


Hx Obtained From: Patient


Hx Last Menstrual Period: n/a


Onset/Duration: Gradual Onset, Lasting Weeks - 3 weeks, Still Present, Worse 

Since - today


Onset Of Symptoms: Gradual


Initially Headache Was: Mild


Currently Pain Is: Moderate


Pain Intensity: 8


Pain Scale Used: 0-10 Numeric


Timing: Constant


Character: Pressure, Typical Headache


Location of Headache: Frontal, Other: - sinus pain


Allevating Factor(s): Medication


Associated Signs And Symptoms: Positive: Sinus Pressure, Fever - subjective at 

home last night.  Negative: Dizziness, Nausea, Vomiting, Neck Pain, Neck 

Stiffness, Decreased LOC, Visual Changes





- Risk Factors


SAH Risk Factors: Negative


Meningitis Risk Factors: Negative


SDH Risk Factors: Negative


Temporal Arteritis Risk Factors: Negative





- Allergies/Home Medications


Allergies/Adverse Reactions: 


 Allergies











Allergy/AdvReac Type Severity Reaction Status Date / Time


 


amoxicillin [From Augmentin] AdvReac  GI Upset Verified 01/07/19 14:56


 


clavulanic acid AdvReac  GI Upset Verified 01/07/19 14:56





[From Augmentin]     


 


ENVIRONMENTAL Allergy  Congestion Uncoded 01/07/19 14:56


 


seasonal Allergy  Congestion Uncoded 01/07/19 14:56











Home Medications: 


 Home Medications





Levothyroxine TAB* [Synthroid 25 MCG TAB*] 25 mcg PO DAILY 01/07/19 [History 

Confirmed 01/07/19]











PMH/Surg Hx/FS Hx/Imm Hx


Previously Healthy: Yes


Endocrine History: Hypothyroidism





- Surgical History


Surgical History: Yes


Surgery Procedure, Year, and Place: thyroid 1970s, sinus 1987, PARTIAL 

hysterectomy 1995.  right carpal tunnel surgery 7/2016





- Family History


Known Family History: Positive: Cardiac Disease - Mom at 93, Diabetes, Other - 

no COPD or asthma Fhx





- Social History


Occupation: Employed Full-time


Lives: With Family


Alcohol Use: Occasionally


Substance Use Type: None


Smoking Status (MU): Heavy Every Day Tobacco Smoker


Type: Cigarettes


Amount Used/How Often: 1/2 PPD


Length of Time of Smoking/Using Tobacco: 30+ YEARS


Have You Smoked in the Last Year: Yes


Household Exposure Type: Cigarettes





- Immunization History


Most Recent Influenza Vaccination: not this season





Review of Systems


All Other Systems Reviewed And Are Negative: Yes


Constitutional: Positive: Fever - subjective at home


Skin: Positive: Negative


Eyes: Positive: Eye Redness - B/L eyes w/ itchiness.  Negative: Drainage, 

Photophobia


ENT: Positive: Nasal Discharge - green, Sinus Congestion, Sinus Pain/Tenderness

, Other - green PND


Respiratory: Positive: Negative


Cardiovascular: Positive: Negative


Gastrointestinal: Positive: Negative


Genitourinary: Positive: Negative


Motor: Positive: Negative


Neurovascular: Positive: Negative


Musculoskeletal: Positive: Negative


Neurological: Positive: Headache


Psychological: Positive: Negative


Is Patient Immunocompromised?: No





Physical Exam





- Summary


Physical Exam Summary: 





Vitals: reviewed


General: Well developed, well-nourished female patient with NAD.


Head and face: Normocephalic and atraumatic, Positive tenderness over the 

frontal and maxillary sinuses..


Eyes: PERRLA, EOMI x 2. B/L conjunctiva mildly injected w/ erythema. No eye 

discharge.


ENT: Ears and TM with normal limits. 


Nose: edematous and erythematous nasal mucosa with  with yellowish discharge 

and erythematous mucosa. Pharynx with erythema, no exudate. green PND


Neck: Supple, no JVD, no carotid bruits and no lymphadenopathy.


Lungs: clear, no rales, no rhonchi, no wheezes.


CVS: RRR, S1 and S2 present no murmurs or gallops appreciated.


Abdomen: soft nontender with positive bowel sounds.


Extremities: no edema noted.


Neuro: WNL. 


Skin: warm and dry





Triage Information Reviewed: Yes


Vital Signs: 


 Initial Vital Signs











Temp  97.6 F   01/07/19 14:52


 


Pulse  92   01/07/19 14:52


 


Resp  16   01/07/19 14:52


 


BP  137/70   01/07/19 14:52


 


Pulse Ox  100   01/07/19 14:52














Headache Course/Dx





- Course


Course Of Treatment: 67 y/o female presents to the urgent care c/o nasal 

congestion w/ green nasal discharge, sinus pain, frontal HA for the past 3 

weeks. Pt reports she has Hx of seasonal allergies, but she thinks her 

sinusitis has now become bacterial because last night she had subjective fever. 

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 

which aggravated her HA and developed a burning sensation over her eye w/ 

itchiness. HA is 8/10 w/ green PND. Pt denies SOB, cough, wheezing, dizziness, 

chest pain, abdominal pain, N/V/D.Hx obtained. Pt w/ allergic conjunctivitis 

and bacterial sinustis on examination. Pt given a toradol IM inj to alleviate 

HA by the nurse. Pt tolerated well medication and felt better. Pt Rx Azelastine 

ophthalmic drops and Amoxicillin PO, Ibuprofen PO as directed below.Discharge 

instructions explained to Pt. Advised to Return to the clinic or  PCP if 

symptoms do not improve.Pt understood and agreed with plan of care.





- Differential Dx/Diagnosis


Differential Diagnosis/HQI/PQRI: Migraine, Sinus Headache, Temporal Arteritis, 

Tension Headache, Other - conjunctivitis


Provider Diagnosis: 


 Allergic conjunctivitis of both eyes, Acute bacterial sinusitis, Sinus headache








Discharge





- Sign-Out/Discharge


Documenting (check all that apply): Patient Departure - D/C home


All imaging exams completed and their final reports reviewed: No Studies





- Discharge Plan


Condition: Stable


Disposition: HOME


Prescriptions: 


Amoxicillin PO (*) [Amoxicillin 875 MG (*)] 875 mg PO BID #20 tab


Azelastine 0.05% (OPHTH)(NF) [Optivar 0.05% (NF)] 1 drop BOTH EYES BID #1 btl


Ibuprofen TAB* [Motrin TAB* 600 MG] 600 mg PO Q6H PRN #30 tab


 PRN Reason: Headache


Patient Education Materials:  Sinusitis (ED), Conjunctivitis (ED)


Forms:  *Work Release


Referrals: 


Allyson Cornejo MD [Primary Care Provider] - 3 Days


Additional Instructions: 


1- Please increase fluid intake and rest. take full course of antibiotic to 

avoid resistance. Take yogurt  w/ probiotics or Culturelle to protect your GI 

system


2-Continue using  Flonase as directed to help drain fluid. Also buy saline 

drops to clear sinuses


3-Take Ibuprofen  PO and Bendaryl PO w6-8hrs prn after meals to alleviates 

sinus congestion


4- Apply Ophthalmic drops to alleviate conjunctivitis 


5-Return to the clinic or  PCP 3 days if symptoms do not improve for further 

management and treatment











- Billing Disposition and Condition


Condition: STABLE


Disposition: Home

## 2019-06-20 ENCOUNTER — HOSPITAL ENCOUNTER (EMERGENCY)
Dept: HOSPITAL 25 - UCCORT | Age: 69
Discharge: HOME | End: 2019-06-20
Payer: COMMERCIAL

## 2019-06-20 VITALS — SYSTOLIC BLOOD PRESSURE: 125 MMHG | DIASTOLIC BLOOD PRESSURE: 69 MMHG

## 2019-06-20 DIAGNOSIS — F17.210: ICD-10-CM

## 2019-06-20 DIAGNOSIS — L25.9: Primary | ICD-10-CM

## 2019-06-20 PROCEDURE — 99212 OFFICE O/P EST SF 10 MIN: CPT

## 2019-06-20 PROCEDURE — G0463 HOSPITAL OUTPT CLINIC VISIT: HCPCS

## 2019-06-20 NOTE — UC
Skin Complaint HPI





- HPI Summary


HPI Summary: 





Pt presents with c/o itchy, red, mildly swollen rash around neck.  Pt has tried 

OTC hydrocortisone cream with no improvement. Pt denies contact with known 

allergen.  Pt takes daily antihistamine. 





- History of Current Complaint


Chief Complaint: UCSkin


Time Seen by Provider: 06/20/19 11:31


Stated Complaint: SKIN CONCERN ON NECK


Hx Obtained From: Patient


Hx Last Menstrual Period: n/a


Pregnant?: No


Onset/Duration: Sudden Onset, Lasting Days - 7, Still Present, Worse Since - 

onset


Skin Exposure Onset/Duration: Days Ago


Timing: Constant


Onset Severity: Mild


Current Severity: Mild


Pain Intensity: 8


Location: Discrete - around base of neck


Character: Swelling, Pruritus, Raised, Painful


Aggravating Factor(s): Touch


Alleviating Factor(s): Nothing


Associated Signs & Symptoms: Positive: Rash, Tenderness


Related History: Possible Reaction to: Environmental Exposure





- Allergy/Home Medications


Allergies/Adverse Reactions: 


 Allergies











Allergy/AdvReac Type Severity Reaction Status Date / Time


 


amoxicillin [From Augmentin] AdvReac  GI Upset Verified 06/20/19 11:15


 


clavulanic acid AdvReac  GI Upset Verified 06/20/19 11:15





[From Augmentin]     


 


ENVIRONMENTAL Allergy  Congestion Uncoded 06/20/19 11:15


 


seasonal Allergy  Congestion Uncoded 06/20/19 11:15














PMH/Surg Hx/FS Hx/Imm Hx


Previously Healthy: Yes


Endocrine History: Thyroid Disease





- Surgical History


Surgical History: Yes


Surgery Procedure, Year, and Place: thyroid 1970s, sinus 1987, PARTIAL 

hysterectomy 1995.  right carpal tunnel surgery 7/2016





- Family History


Known Family History: Positive: Cardiac Disease - Mom at 93, Diabetes, Other - 

no COPD or asthma Fhx





- Social History


Occupation: Employed Full-time


Lives: With Family


Alcohol Use: Occasionally


Substance Use Type: None


Smoking Status (MU): Heavy Every Day Tobacco Smoker


Type: Cigarettes


Amount Used/How Often: 1/2 PPD


Length of Time of Smoking/Using Tobacco: 30+ YEARS


Have You Smoked in the Last Year: Yes


Household Exposure Type: Cigarettes





- Immunization History


Most Recent Influenza Vaccination: not this season


Vaccination Up to Date: No





Review of Systems


All Other Systems Reviewed And Are Negative: Yes


Constitutional: Positive: Negative


Skin: Positive: Rash


Eyes: Positive: Negative


ENT: Positive: Negative


Respiratory: Positive: Negative


Cardiovascular: Positive: Negative


Gastrointestinal: Positive: Negative


Genitourinary: Positive: Negative


Motor: Positive: Negative


Neurovascular: Positive: Negative


Musculoskeletal: Positive: Negative


Neurological: Positive: Negative


Psychological: Positive: Negative


Is Patient Immunocompromised?: No





Physical Exam


Triage Information Reviewed: Yes


Appearance: Well-Appearing


Vital Signs: 


 Initial Vital Signs











Temp  97.8 F   06/20/19 11:09


 


Pulse  79   06/20/19 11:09


 


Resp  18   06/20/19 11:09


 


BP  125/69   06/20/19 11:09


 


Pulse Ox  100   06/20/19 11:09











Vital Signs Reviewed: Yes


Eye Exam: Normal


ENT Exam: Normal


ENT: Positive: Hearing grossly normal


Dental Exam: Normal


Neck exam: Normal


Respiratory: Positive: No respiratory distress


Musculoskeletal Exam: Normal


Neurological Exam: Normal


Psychological Exam: Normal


Skin: Positive: Rashes - mild erythema, warm to touch, urticaria appearce with 

tiny clear fluid filled vesicles.





Course/Dx





- Differential Diagnoses - Skin Complaint


Differential Diagnoses: Allergic Reaction, Contact Dermatitis





- Diagnoses


Provider Diagnosis: 


 Contact dermatitis








Discharge





- Sign-Out/Discharge


Documenting (check all that apply): Patient Departure


All imaging exams completed and their final reports reviewed: No Studies





- Discharge Plan


Condition: Stable


Disposition: HOME


Prescriptions: 


predniSONE TAB* [Deltasone 10 MG TAB*] 30 mg PO DAILY #12 tab


Patient Education Materials:  Antihistamine (By mouth), Contact Dermatitis (ED)


Referrals: 


Allyson Cornejo MD [Primary Care Provider] - If Needed





- Billing Disposition and Condition


Condition: STABLE


Disposition: Home

## 2019-10-10 ENCOUNTER — HOSPITAL ENCOUNTER (EMERGENCY)
Dept: HOSPITAL 25 - UCCORT | Age: 69
Discharge: HOME | End: 2019-10-10
Payer: COMMERCIAL

## 2019-10-10 VITALS — DIASTOLIC BLOOD PRESSURE: 58 MMHG | SYSTOLIC BLOOD PRESSURE: 127 MMHG

## 2019-10-10 DIAGNOSIS — H92.02: ICD-10-CM

## 2019-10-10 DIAGNOSIS — J32.9: Primary | ICD-10-CM

## 2019-10-10 DIAGNOSIS — F17.210: ICD-10-CM

## 2019-10-10 DIAGNOSIS — Z88.0: ICD-10-CM

## 2019-10-10 DIAGNOSIS — Z91.09: ICD-10-CM

## 2019-10-10 PROCEDURE — 99211 OFF/OP EST MAY X REQ PHY/QHP: CPT

## 2019-10-10 PROCEDURE — G0463 HOSPITAL OUTPT CLINIC VISIT: HCPCS

## 2019-10-10 NOTE — UC
Throat Pain/Nasal Roni HPI





- HPI Summary


HPI Summary: 





Pt c/o sinus congestion,pressure and pain,  left ear pain and drainage, fever, 

chills, X 2 weeks. Pt is a  at [employer]





- History of Current Complaint


Chief Complaint: UCGeneralIllness


Stated Complaint: SINUS/EAR COMPLAINT


Time Seen by Provider: 10/10/19 13:44


Hx Obtained From: Patient


Hx Last Menstrual Period: n/a


Pregnant?: No


Onset/Duration: Sudden Onset, Lasting Weeks, Still Present


Severity: Moderate


Pain Intensity: 7


Associated Signs & Symptoms: Positive: Sinus Discomfort, Fever


Related History: Smoking





- Epiglottits Risk Factors


Epiglottis Risk Factors: Negative





- Allergies/Home Medications


Allergies/Adverse Reactions: 


 Allergies











Allergy/AdvReac Type Severity Reaction Status Date / Time


 


amoxicillin [From Augmentin] AdvReac  GI Upset Verified 06/20/19 11:15


 


clavulanic acid AdvReac  GI Upset Verified 06/20/19 11:15





[From Augmentin]     


 


ENVIRONMENTAL Allergy  Congestion Uncoded 06/20/19 11:15


 


seasonal Allergy  Congestion Uncoded 06/20/19 11:15














PMH/Surg Hx/FS Hx/Imm Hx


Previously Healthy: Yes





- Surgical History


Surgical History: Yes


Surgery Procedure, Year, and Place: thyroid 1970s, sinus 1987, PARTIAL 

hysterectomy 1995.  right carpal tunnel surgery 7/2016





- Family History


Known Family History: Positive: Cardiac Disease - Mom at 93, Diabetes, Other - 

no COPD or asthma Fhx





- Social History


Occupation: Employed Full-time -  at [employer]


Lives: With Family


Alcohol Use: Occasionally


Substance Use Type: None


Smoking Status (MU): Heavy Every Day Tobacco Smoker


Type: Cigarettes


Amount Used/How Often: 1/2 PPD


Length of Time of Smoking/Using Tobacco: 30+ YEARS


Have You Smoked in the Last Year: Yes


Household Exposure Type: Cigarettes





- Immunization History


Most Recent Influenza Vaccination: not this season


Vaccination Up to Date: No





Review of Systems


All Other Systems Reviewed And Are Negative: Yes


Constitutional: Positive: Fever, Chills, Fatigue


Skin: Positive: Negative


Eyes: Positive: Negative


ENT: Positive: Ear Ache, Sinus Congestion, Sinus Pain/Tenderness


Respiratory: Positive: Cough


Cardiovascular: Positive: Negative


Gastrointestinal: Positive: Negative


Genitourinary: Positive: Negative


Motor: Positive: Negative


Neurovascular: Positive: Negative


Musculoskeletal: Positive: Myalgia


Neurological: Positive: Headache


Psychological: Positive: Negative


Is Patient Immunocompromised?: No





Physical Exam


Triage Information Reviewed: Yes


Appearance: Ill-Appearing


Vital Signs: 


 Initial Vital Signs











Temp  98.4 F   10/10/19 13:14


 


Pulse  80   10/10/19 13:14


 


Resp  16   10/10/19 13:14


 


BP  127/58   10/10/19 13:14


 


Pulse Ox  99   10/10/19 13:14











Vital Signs Reviewed: Yes


Eye Exam: Normal


ENT Exam: Other


ENT: Positive: Nasal congestion, TM bulging, TM red - small perforation in left 

TM pt states that it is permanent has hx of OM as child, Sinus tenderness


Dental Exam: Normal


Neck exam: Normal


Respiratory Exam: Normal


Cardiovascular Exam: Normal


Musculoskeletal Exam: Normal


Neurological Exam: Normal


Psychological Exam: Normal


Skin Exam: Normal





Throat Pain/Nasal Course/Dx





- Differential Dx/Diagnosis


Differential Diagnosis/HQI/PQRI: Otitis Media, Sinusitis, URI


Provider Diagnosis: 


 Sinusitis








Discharge ED





- Sign-Out/Discharge


Documenting (check all that apply): Patient Departure


All imaging exams completed and their final reports reviewed: No Studies





- Discharge Plan


Condition: Stable


Disposition: HOME


Prescriptions: 


Amoxicillin PO (*) [Amoxicillin 875 MG (*)] 875 mg PO BID #20 tab


guaiFENesin [Mucinex] 600 mg PO Q12H #10 tab.er.12h


Patient Education Materials:  Sinusitis (ED)


Referrals: 


Allyson Cornejo MD [Primary Care Provider] - If Needed





- Billing Disposition and Condition


Condition: STABLE


Disposition: Home





- Attestation Statements


Provider Attestation: 





Per institutional requirements, I have reviewed the chart, however, I was not 

consulted specifically or made aware of this patient by the  midlevel provider.

  I did not personally evaluate, interact with , or disposition  this patient.

## 2019-10-10 NOTE — XMS REPORT
Continuity of Care Document (CCD)

 Created on:October 3, 2019



Patient:Haydee Mac

Sex:Female

:1950

External Reference #:[id number]





Demographics







 Address  [address] [place] [address]

 

 Mobile Phone  [phone]

 

 Email Address  [email or web address]

 

 Preferred Language  en

 

 Marital Status  Not  or 

 

 Druze Affiliation  Unknown

 

 Race  White

 

 Ethnic Group  Not  or 









Author







 Name  Leana Pino M.D. (transmitted by agent of provider Pao Rosenbaum)

 

 Address  [address]



   [place]



   [place] [address]









Care Team Providers







 Name  Role  Phone

 

 Puma Cee, MARC - Physician  Care Team Information   [phone]
[phone]



 Assistant    









Problems







 Active Problems  Provider  Date

 

 Perforation of tympanic membrane  Jerrod Parrish M.D.  Onset: 10/18/2012

 

 Tympanic membrane conductive hearing loss  Jerrod Parrish M.D.  Onset: 10/


 

 Acute myringitis  Jerrod Parrish M.D.  Onset: 10/18/2012

 

 Displacement of lumbar intervertebral disc  Addis Lozada MD  Onset: 09/10/2015



 without myelopathy    

 

 Carpal tunnel syndrome of right wrist  Addis Lozada MD  Onset: 10/29/2015

 

 Low back pain  Addis Lozada MD  Onset: 10/29/2015

 

 Lumbar radiculopathy  Addis Lozada MD  Onset: 2016







Social History







 Type  Date  Description  Comments

 

 Birth Sex    Unknown  

 

 Tobacco Use  Start: Unknown  currently smokes 1/2 Pack Daily  

 

 Tobacco Use  Start: Unknown  Never Smoked Cigars  

 

 Tobacco Use  Start: Unknown  Never Smoked A Pipe  

 

 Smokeless Tobacco    Never Used Smokeless Tobacco  

 

 ETOH Use    Occasionally consumes alcohol  

 

 Tobacco Use  Start: Unknown  Patient is a current smoker, smokes  



     every day  

 

 Recreational Drug Use    Denies Drug Use  

 

 Smoking Status  Reviewed: 10/03/19  Patient is a current smoker, smokes  



     every day  







Allergies, Adverse Reactions, Alerts







 Description

 

 No Known Drug Allergies







Medications







 Active Medications  SIG  Qnty  Indications  Ordering  Date



         Provider  

 

 Nexium  1 po qd  90caps    Lucinda Cross,  



      40mg Capsules DR ARITA  



           

 

 Flonase  1 intranasal puff  1units    Lucinda Cross,  



       50mcg/Act  to each nostril      MD  



 Suspension  daily        



           

 

 Astelin  1 po prn  1units    Lucinda Cross,  



       137mcg/Spray        MD  



 Solution          



           

 

 OTC Allergy Med  1 po qday      Unknown  

 

 Tylenol Arthritis  2 by mouth every 8      Unknown  



 Pain  hours as needed        



    650mg Tablets ER          



           

 

 Ibuprofen  as needed      Unknown  



         200mg Tablets          



           

 

 Levothyroxine Sodium        Unknown  



           



 50mcg Tablets          



           







Medications Administered in Office







 Medication  SIG  Qnty  Indications  Ordering Provider  Date

 

 Celestone 3 mg and 3mg        Edgardo Rodriguez MD  10/09/2018



             Injection          



           

 

 Celestone 3 mg and 3mg        Edgardo Rodriguez MD  2017



             Injection          



           

 

 Celestone 3 mg and 3mg        Edgardo Rodriguez MD  2017



             Injection          



           

 

 Depomedrol 40MG        Leana Pino M.D.  2016



      Injection          

 

 Depomedrol 80MG        Leana Pino M.D.  2015



      Injection          

 

 Depomedrol 80MG        Leana Pino M.D.  2013



      Injection          

 

 Depomedrol 80MG        Leana Pino M.D.  2012



      Injection          







Immunizations







 Description

 

 No Information Available







Vital Signs







 Date  Vital  Result  Comment

 

 10/03/2019  2:45pm  Height  68 inches  5'8"









 Weight  171.00 lb  

 

 Heart Rate  80 /min  

 

 BP Systolic  134 mmHg  

 

 BP Diastolic  90 mmHg  

 

 Respiratory Rate  16 /min  

 

 Body Temperature  97.5 F  

 

 Pain Level  8  

 

 BMI (Body Mass Index)  26.0 kg/m2  









 10/09/2018  1:14pm  Height  68 inches  5'8"









 Weight  172.00 lb  

 

 BP Systolic Sitting  128 mmHg  

 

 BP Diastolic Sitting  78 mmHg  

 

 Respiratory Rate  17 /min  

 

 Pain Level  9  

 

 BMI (Body Mass Index)  26.1 kg/m2  







Results







 Description

 

 No Information Available







Procedures







 Date  Code  Description  Status

 

 10/03/2019  32798  Inject/Drain Joint/Bursa Small W/O US  Completed

 

 10/03/2019  81041  Injection, Carpal Tunnel  Completed

 

 2019  [id number]  Colonoscopy  Completed







Medical Devices







 Description

 

 No Information Available







Encounters







 Description

 

 No Information Available







Assessments







 Date  Code  Description  Provider

 

 10/03/2019  G56.01  Carpal tunnel syndrome, right upper limb  Leana Pino M.D.

 

 10/03/2019  M18.11  Unilateral primary osteoarthritis of first  Leana Pino
, M.D.



     carpometacarpal joint, right hand  







Plan of Treatment

10/03/2019 - Leana Pino M.D.G56.01 Carpal tunnel syndrome, right upper 
limbFollow up:Follow up:   As [id number] Unilateral primary osteoarthritis of 
first carpometacarpal joint, right hand



Functional Status







 Description

 

 No Information Available







Mental Status







 Description

 

 No Information Available







Referrals







 Description

 

 No Information Available